# Patient Record
Sex: MALE | Race: WHITE | Employment: FULL TIME | URBAN - METROPOLITAN AREA
[De-identification: names, ages, dates, MRNs, and addresses within clinical notes are randomized per-mention and may not be internally consistent; named-entity substitution may affect disease eponyms.]

---

## 2017-05-02 ENCOUNTER — OFFICE VISIT (OUTPATIENT)
Dept: URGENT CARE | Facility: URGENT CARE | Age: 47
End: 2017-05-02
Payer: COMMERCIAL

## 2017-05-02 VITALS
BODY MASS INDEX: 50.28 KG/M2 | WEIGHT: 315 LBS | SYSTOLIC BLOOD PRESSURE: 128 MMHG | TEMPERATURE: 98.4 F | DIASTOLIC BLOOD PRESSURE: 86 MMHG | HEART RATE: 69 BPM

## 2017-05-02 DIAGNOSIS — L03.032 CELLULITIS OF TOE OF LEFT FOOT: Primary | ICD-10-CM

## 2017-05-02 DIAGNOSIS — L08.9 INFLAMMATION OF TOE: ICD-10-CM

## 2017-05-02 LAB
URATE SERPL-MCNC: 6.4 MG/DL (ref 3.5–7.2)
WBC # BLD AUTO: 4.5 10E9/L (ref 4–11)

## 2017-05-02 PROCEDURE — 85048 AUTOMATED LEUKOCYTE COUNT: CPT | Performed by: PHYSICIAN ASSISTANT

## 2017-05-02 PROCEDURE — 36415 COLL VENOUS BLD VENIPUNCTURE: CPT | Performed by: PHYSICIAN ASSISTANT

## 2017-05-02 PROCEDURE — 84550 ASSAY OF BLOOD/URIC ACID: CPT | Performed by: PHYSICIAN ASSISTANT

## 2017-05-02 PROCEDURE — 99213 OFFICE O/P EST LOW 20 MIN: CPT | Performed by: PHYSICIAN ASSISTANT

## 2017-05-02 RX ORDER — SULFAMETHOXAZOLE/TRIMETHOPRIM 800-160 MG
1 TABLET ORAL 2 TIMES DAILY
Qty: 14 TABLET | Refills: 0 | Status: SHIPPED | OUTPATIENT
Start: 2017-05-02

## 2017-05-02 NOTE — MR AVS SNAPSHOT
After Visit Summary   5/2/2017    Ramos Springer    MRN: [id number]           Patient Information     Date Of Birth          1970        Visit Information        Provider Department      5/2/2017 7:00 PM Leighann Marsh PA-C [facility]        Today's Diagnoses     Cellulitis of toe of left foot    -  1    Inflammation of toe           Follow-ups after your visit        Who to contact     If you have questions or need follow up information about today's clinic visit or your schedule please contact [facility] directly at [phone].  Normal or non-critical lab and imaging results will be communicated to you by Picklivehart, letter or phone within 4 business days after the clinic has received the results. If you do not hear from us within 7 days, please contact the clinic through Picklivehart or phone. If you have a critical or abnormal lab result, we will notify you by phone as soon as possible.  Submit refill requests through Triptease or call your pharmacy and they will forward the refill request to us. Please allow 3 business days for your refill to be completed.          Additional Information About Your Visit        MyChart Information     Triptease gives you secure access to your electronic health record. If you see a primary care provider, you can also send messages to your care team and make appointments. If you have questions, please call your primary care clinic.  If you do not have a primary care provider, please call [phone] and they will assist you.        Care EveryWhere ID     This is your Care EveryWhere ID. This could be used by other organizations to access your [place] medical records  [id number]        Your Vitals Were     Pulse Temperature BMI (Body Mass Index)             69 98.4  F (36.9  C) (Oral) 50.28 kg/m2          Blood Pressure from Last 3 Encounters:   05/02/17 128/86   04/16/16 110/80   05/17/15 (!) 148/104     Weight from Last 3 Encounters:   05/02/17 (!) 402 lb 4 oz (182.5 kg)   04/16/16 (!) 412 lb 9.6 oz (187.2 kg)   05/16/15 (!) 370 lb (167.8 kg)              We Performed the Following     Uric acid     WBC count          Today's Medication Changes          These changes are accurate as of: 5/2/17  8:16 PM.  If you have any questions, ask your nurse or doctor.               Start taking these medicines.        Dose/Directions    sulfamethoxazole-trimethoprim 800-160 MG per tablet   Commonly known as:  BACTRIM DS/SEPTRA DS   Used for:  Cellulitis of toe of left foot   Started by:  Leighann Marsh PA-C        Dose:  1 tablet   Take 1 tablet by mouth 2 times daily   Quantity:  14 tablet   Refills:  0            Where to get your medicines      These medications were sent to [facility] [address] [place] [address] [address] AT [address]  [address], [facility] [place] [facility] [address]    Hours:  24-hours Phone:  [phone]     sulfamethoxazole-trimethoprim 800-160 MG per tablet                Primary Care Provider Office Phone # Fax #    Oleg Shabazz [phone]       [facility] [address] [place] [address]        Thank you!     Thank you for choosing [facility]  for your care. Our goal is always to provide you with excellent care. Hearing back from our patients is one way we can continue to improve our services. Please take a few minutes to complete the written survey that you may receive in the mail after your visit with us. Thank you!             Your Updated Medication List - Protect others around you: Learn how to safely use, store and throw away your medicines at www.disposemymeds.org.          This list is accurate as of: 5/2/17  8:16 PM.  Always use your most recent med list.                   Brand Name Dispense Instructions for use    CYANOCOBALAMIN SL      Place 2,000 mcg under the tongue daily  Reported on 5/2/2017       ferrous sulfate 325 (65 FE) MG tablet    IRON     Take 325 mg by mouth daily (with breakfast) Reported on 5/2/2017       lisinopril 40 MG tablet    PRINIVIL/ZESTRIL     Take 40 mg by mouth daily       Multi-vitamin Tabs tablet      Take 2 tablets by mouth daily Reported on 5/2/2017       naproxen 500 MG tablet    NAPROSYN    60 tablet    Take 1 tablet (500 mg) by mouth 2 times daily (with meals)       sulfamethoxazole-trimethoprim 800-160 MG per tablet    BACTRIM DS/SEPTRA DS    14 tablet    Take 1 tablet by mouth 2 times daily       TUMS CALCIUM FOR LIFE BONE 750 MG Chew   Generic drug:  calcium carbonate      Take 750 mg by mouth 2 times daily Reported on 5/2/2017       vitamin C 500 MG Chew      Take 1 tablet by mouth daily Reported on 5/2/2017       VITAMIN D3 PO      Take 5,000 Units by mouth daily Reported on 5/2/2017       ZYRTEC 10 MG tablet   Generic drug:  cetirizine      Take 10 mg by mouth daily.

## 2017-05-02 NOTE — LETTER
[facility]    [address]  [address]  Phone: [phone]     May 2, 2017      RE: Ramos Springer  [address] [address]       To whom it may concern:    Ramos Springer was seen in our clinic today. Please excuse him from work 5/1/2017-5/2/2017.   Sincerely,      Leighann Marsh PA-C

## 2017-05-03 NOTE — PROGRESS NOTES
SUBJECTIVE:  Chief Complaint   Patient presents with     Toe Pain     left big toe swelling with pain for a few days.      Letter for School/Work     request work note.      Ramos Springer is a 46 year old male who presents with a chief complaint of left great toe swelling, tenderness and redness.  Symptoms began 2 day(s) ago, are moderate and still present  Context:  Injury:No.    Patient has a history of an infection on the sole of his foot that is currently being treated by [facility].   Pain exacerbated by pressure Relieved by rest.   This is not the first time this type of injury has occurred to this patient.     Patient does not have a history of DM.     Past Medical History:   Diagnosis Date     Hypersomnia with sleep apnea, unspecified     severe     Obesity, unspecified      Unspecified essential hypertension      Current Outpatient Prescriptions   Medication Sig Dispense Refill     lisinopril (PRINIVIL,ZESTRIL) 40 MG tablet Take 40 mg by mouth daily       cetirizine (ZYRTEC) 10 MG tablet Take 10 mg by mouth daily.       naproxen (NAPROSYN) 500 MG tablet Take 1 tablet (500 mg) by mouth 2 times daily (with meals) (Patient not taking: Reported on 5/2/2017) 60 tablet 0     ferrous sulfate 325 (65 FE) MG tablet Take 325 mg by mouth daily (with breakfast) Reported on 5/2/2017       Ascorbic Acid (VITAMIN C) 500 MG CHEW Take 1 tablet by mouth daily Reported on 5/2/2017       calcium carbonate (TUMS CALCIUM FOR LIFE BONE) 750 MG CHEW Take 750 mg by mouth 2 times daily Reported on 5/2/2017       multivitamin, therapeutic with minerals (MULTI-VITAMIN) TABS Take 2 tablets by mouth daily Reported on 5/2/2017       Cholecalciferol (VITAMIN D3 PO) Take 5,000 Units by mouth daily Reported on 5/2/2017       CYANOCOBALAMIN SL Place 2,000 mcg under the tongue daily Reported on 5/2/2017       Social History   Substance Use Topics     Smoking status: Passive Smoke Exposure - Never Smoker     Smokeless tobacco: Never Used       Comment: Pt works in a [employer]/passive     Alcohol use No       ROS:  Review of systems negative except as stated below    EXAM:   /86 (BP Location: Left arm, Patient Position: Chair, Cuff Size: Adult Large)  Pulse 69  Temp 98.4  F (36.9  C) (Oral)  Wt (!) 402 lb 4 oz (182.5 kg)  BMI 50.28 kg/m2  M/S Exam: Left toe has swelling, redness and tenderness to palpation poximal to nail. Underlying fungal infection of nail. erythema, swelling and tenderness to palpationGENERAL APPEARANCE: healthy, alert and no distress  EXTREMITIES: peripheral pulses normal  SKIN: no suspicious lesions or rashes  NEURO: Normal strength and tone, sensory exam grossly normal, mentation intact and speech normal  Results for orders placed or performed in visit on 05/02/17   WBC count   Result Value Ref Range    WBC 4.5 4.0 - 11.0 10e9/L   Uric acid   Result Value Ref Range    Uric Acid 6.4 3.5 - 7.2 mg/dL       X-RAY was not done.    ASSESSMENT/PLAN:  1. Cellulitis of toe of left foot  Underlying fungal infection. Follow up with [facility] this week.   - sulfamethoxazole-trimethoprim (BACTRIM DS/SEPTRA DS) 800-160 MG per tablet; Take 1 tablet by mouth 2 times daily  Dispense: 14 tablet; Refill: 0    2. Inflammation of toe  - WBC count  - Uric acid    Leighann Marsh PA-C

## 2017-05-03 NOTE — NURSING NOTE
"Chief Complaint   Patient presents with     Toe Pain     left big toe swelling with pain for a few days.      Letter for School/Work     request work note.        Initial /86 (BP Location: Left arm, Patient Position: Chair, Cuff Size: Adult Large)  Pulse 69  Temp 98.4  F (36.9  C) (Oral)  Wt (!) 402 lb 4 oz (182.5 kg)  BMI 50.28 kg/m2 Estimated body mass index is 50.28 kg/(m^2) as calculated from the following:    Height as of 4/16/16: 6' 3\" (1.905 m).    Weight as of this encounter: 402 lb 4 oz (182.5 kg).  Medication Reconciliation: complete    "

## 2017-06-11 ENCOUNTER — HOSPITAL ENCOUNTER (EMERGENCY)
Facility: CLINIC | Age: 47
Discharge: HOME | End: 2017-06-11
Attending: EMERGENCY MEDICINE | Admitting: EMERGENCY MEDICINE
Payer: COMMERCIAL

## 2017-06-11 ENCOUNTER — APPOINTMENT (OUTPATIENT)
Dept: MRI IMAGING | Facility: CLINIC | Age: 47
End: 2017-06-11
Attending: EMERGENCY MEDICINE
Payer: COMMERCIAL

## 2017-06-11 VITALS
TEMPERATURE: 98.3 F | HEART RATE: 74 BPM | WEIGHT: 315 LBS | OXYGEN SATURATION: 100 % | RESPIRATION RATE: 16 BRPM | DIASTOLIC BLOOD PRESSURE: 91 MMHG | HEIGHT: 75 IN | BODY MASS INDEX: 39.17 KG/M2 | SYSTOLIC BLOOD PRESSURE: 138 MMHG

## 2017-06-11 DIAGNOSIS — L08.9 INFECTION OF TOE: ICD-10-CM

## 2017-06-11 LAB
ANION GAP SERPL CALCULATED.3IONS-SCNC: 5 MMOL/L (ref 3–14)
BASOPHILS # BLD AUTO: 0 10E9/L (ref 0–0.2)
BASOPHILS NFR BLD AUTO: 0.6 %
BUN SERPL-MCNC: 11 MG/DL (ref 7–30)
CALCIUM SERPL-MCNC: 8.7 MG/DL (ref 8.5–10.1)
CHLORIDE SERPL-SCNC: 107 MMOL/L (ref 94–109)
CO2 SERPL-SCNC: 29 MMOL/L (ref 20–32)
CREAT SERPL-MCNC: 0.96 MG/DL (ref 0.66–1.25)
CRP SERPL-MCNC: 3.1 MG/L (ref 0–8)
DIFFERENTIAL METHOD BLD: NORMAL
EOSINOPHIL # BLD AUTO: 0.1 10E9/L (ref 0–0.7)
EOSINOPHIL NFR BLD AUTO: 1.6 %
ERYTHROCYTE [DISTWIDTH] IN BLOOD BY AUTOMATED COUNT: 14.2 % (ref 10–15)
ERYTHROCYTE [SEDIMENTATION RATE] IN BLOOD BY WESTERGREN METHOD: 8 MM/H (ref 0–15)
GFR SERPL CREATININE-BSD FRML MDRD: 85 ML/MIN/1.7M2
GLUCOSE SERPL-MCNC: 95 MG/DL (ref 70–99)
HCT VFR BLD AUTO: 43.1 % (ref 40–53)
HGB BLD-MCNC: 14.2 G/DL (ref 13.3–17.7)
IMM GRANULOCYTES # BLD: 0 10E9/L (ref 0–0.4)
IMM GRANULOCYTES NFR BLD: 0 %
LACTATE BLD-SCNC: 1 MMOL/L (ref 0.7–2.1)
LYMPHOCYTES # BLD AUTO: 1.5 10E9/L (ref 0.8–5.3)
LYMPHOCYTES NFR BLD AUTO: 29.4 %
MCH RBC QN AUTO: 30.3 PG (ref 26.5–33)
MCHC RBC AUTO-ENTMCNC: 32.9 G/DL (ref 31.5–36.5)
MCV RBC AUTO: 92 FL (ref 78–100)
MONOCYTES # BLD AUTO: 0.7 10E9/L (ref 0–1.3)
MONOCYTES NFR BLD AUTO: 13.1 %
NEUTROPHILS # BLD AUTO: 2.7 10E9/L (ref 1.6–8.3)
NEUTROPHILS NFR BLD AUTO: 55.3 %
NRBC # BLD AUTO: 0 10*3/UL
NRBC BLD AUTO-RTO: 0 /100
PLATELET # BLD AUTO: 233 10E9/L (ref 150–450)
POTASSIUM SERPL-SCNC: 3.9 MMOL/L (ref 3.4–5.3)
RBC # BLD AUTO: 4.69 10E12/L (ref 4.4–5.9)
SODIUM SERPL-SCNC: 141 MMOL/L (ref 133–144)
WBC # BLD AUTO: 5 10E9/L (ref 4–11)

## 2017-06-11 PROCEDURE — 85652 RBC SED RATE AUTOMATED: CPT | Performed by: EMERGENCY MEDICINE

## 2017-06-11 PROCEDURE — 96365 THER/PROPH/DIAG IV INF INIT: CPT | Mod: 59

## 2017-06-11 PROCEDURE — A9585 GADOBUTROL INJECTION: HCPCS | Performed by: EMERGENCY MEDICINE

## 2017-06-11 PROCEDURE — 86140 C-REACTIVE PROTEIN: CPT | Performed by: EMERGENCY MEDICINE

## 2017-06-11 PROCEDURE — 25000128 H RX IP 250 OP 636: Performed by: EMERGENCY MEDICINE

## 2017-06-11 PROCEDURE — 85025 COMPLETE CBC W/AUTO DIFF WBC: CPT | Performed by: EMERGENCY MEDICINE

## 2017-06-11 PROCEDURE — 80048 BASIC METABOLIC PNL TOTAL CA: CPT | Performed by: EMERGENCY MEDICINE

## 2017-06-11 PROCEDURE — 83605 ASSAY OF LACTIC ACID: CPT | Performed by: EMERGENCY MEDICINE

## 2017-06-11 PROCEDURE — 96366 THER/PROPH/DIAG IV INF ADDON: CPT

## 2017-06-11 PROCEDURE — 73723 MRI JOINT LWR EXTR W/O&W/DYE: CPT | Mod: LT

## 2017-06-11 PROCEDURE — 99285 EMERGENCY DEPT VISIT HI MDM: CPT | Mod: 25

## 2017-06-11 RX ORDER — GADOBUTROL 604.72 MG/ML
20 INJECTION INTRAVENOUS ONCE
Status: COMPLETED | OUTPATIENT
Start: 2017-06-11 | End: 2017-06-11

## 2017-06-11 RX ORDER — LEVOFLOXACIN 500 MG/1
500 TABLET, FILM COATED ORAL DAILY
Qty: 7 TABLET | Refills: 0 | Status: SHIPPED | OUTPATIENT
Start: 2017-06-11 | End: 2017-06-18

## 2017-06-11 RX ORDER — LEVOFLOXACIN 5 MG/ML
750 INJECTION, SOLUTION INTRAVENOUS ONCE
Status: COMPLETED | OUTPATIENT
Start: 2017-06-11 | End: 2017-06-11

## 2017-06-11 RX ADMIN — LEVOFLOXACIN 750 MG: 5 INJECTION, SOLUTION INTRAVENOUS at 15:30

## 2017-06-11 RX ADMIN — GADOBUTROL 20 ML: 604.72 INJECTION INTRAVENOUS at 14:38

## 2017-06-11 ASSESSMENT — ENCOUNTER SYMPTOMS
NUMBNESS: 0
WOUND: 1
COLOR CHANGE: 1
FEVER: 0
CHILLS: 0

## 2017-06-11 NOTE — ED AVS SNAPSHOT
Emergency Department    [address] [facility] [address]    Phone:  [phone]    Fax:  [phone]                                       Ramos Springer   MRN: [id number]    Department:   Emergency Department   Date of Visit:  6/11/2017           Patient Information     Date Of Birth          1970        Your diagnoses for this visit were:     Infection of toe        You were seen by Ajay Johnson MD.      Follow-up Information     Schedule an appointment as soon as possible for a visit with Oleg Shabazz MD.    Specialties:  Internal Medicine, Pediatrics    Contact information:    [facility]  [address] [place] [address] [phone]          Schedule an appointment as soon as possible for a visit with Mannie Barber MD.    Specialty:  Orthopedics    Contact information:    [facility]   [address] [facility] [address]  [facility] [address] [phone]          Follow up with  Emergency Department.    Specialty:  EMERGENCY MEDICINE    Why:  As needed, If symptoms worsen    Contact information:    [address] [facility] [id number] [phone]        Discharge Instructions         Discharge Instructions  Cellulitis    Cellulitis is an infection of the skin that occurs when bacteria enter the skin.   Symptoms are generally redness, swelling, warmth and pain.  Your infection appeared to be appropriate to treat at home with antibiotics.  However, sometimes your infection may be worse than it seemed at first, or may worsen with time. If you have new or worse symptoms, you may need to be seen again in the Emergency Department or by your primary doctor.    Return to the Emergency Department if:    The redness, pain, or swelling gets a lot worse.  If the red area was marked, return if it is red beyond the marked area.    You are unable to get your antibiotics, or are vomiting them up or you can t take  "them.    You are feeling more ill, weak or lightheaded.    You start to run a new fever (temperature >101).    Anything else about the infection worries or concerns you.  Treatment:    Start your antibiotics right away, and take them as prescribed. Be sure to finish the whole prescription, even if you are better.    Apply a heating pad, warm packs, or warm water soaks to the infected area for 15 minutes at a time, at least 3 times a day. Do not use a heating pad on your feet or legs if you have diabetes. Do not sleep with a heating pad on, since this can cause burns or skin injury.    Rest your injured area for at least 1-2 days. After that you may start using your extremity again as long as there is not too much pain.     Raise the injured area above the level of your heart as much as possible in the first 1-2 days.    Tylenol  (acetaminophen), Motrin  (ibuprofen), or Advil  (ibuprofen) may help may help reduce pain and fever and may help you feel more comfortable. Be sure to read and follow the package directions, and ask your doctor if you have questions.    Follow-up with your doctor:    Re-check in clinic within 2-3 days.  Probiotics: If you have been given an antibiotic, you may want to also take a probiotic pill or eat yogurt with live cultures. Probiotics have \"good bacteria\" to help your intestines stay healthy. Studies have shown that probiotics help prevent diarrhea and other intestine problems (including C. diff infection) when you take antibiotics. You can buy these without a prescription in the pharmacy section of the store.     If you were given a prescription for medicine here today, be sure to read all of the information (including the package insert) that comes with your prescription.  This will include important information about the medicine, its side effects, and any warnings that you need to know about.  The pharmacist who fills the prescription can provide more information and answer questions " you may have about the medicine.  If you have questions or concerns that the pharmacist cannot address, please call or return to the Emergency Department.     Opioid Medication Information    Pain medications are among the most commonly prescribed medicines, so we are including this information for all our patients. If you did not receive pain medication or get a prescription for pain medicine, you can ignore it.     You may have been given a prescription for an opioid (narcotic) pain medicine and/or have received a pain medicine while here in the Emergency Department. These medicines can make you drowsy or impaired. You must not drive, operate dangerous equipment, or engage in any other dangerous activities while taking these medications. If you drive while taking these medications, you could be arrested for DUI, or driving under the influence. Do not drink any alcohol while you are taking these medications.     Opioid pain medications can cause addiction. If you have a history of chemical dependency of any type, you are at a higher risk of becoming addicted to pain medications.  Only take these prescribed medications to treat your pain when all other options have been tried. Take it for as short a time and as few doses as possible. Store your pain pills in a secure place, as they are frequently stolen and provide a dangerous opportunity for children or visitors in your house to start abusing these powerful medications. We will not replace any lost or stolen medicine.  As soon as your pain is better, you should flush all your remaining medication.     Many prescription pain medications contain Tylenol  (acetaminophen), including Vicodin , Tylenol #3 , Norco , Lortab , and Percocet .  You should not take any extra pills of Tylenol  if you are using these prescription medications or you can get very sick.  Do not ever take more than 3000 mg of acetaminophen in any 24 hour period.    All opioids tend to cause  constipation. Drink plenty of water and eat foods that have a lot of fiber, such as fruits, vegetables, prune juice, apple juice and high fiber cereal.  Take a laxative if you don t move your bowels at least every other day. Miralax , Milk of Magnesia, Colace , or Senna  can be used to keep you regular.      Remember that you can always come back to the Emergency Department if you are not able to see your regular doctor in the amount of time listed above, if you get any new symptoms, or if there is anything that worries you.      24 Hour Appointment Hotline       To make an appointment at any [facility], call [phone] ([phone]). If you don't have a family doctor or clinic, we will help you find one. [facility] clinics are conveniently located to serve the needs of you and your family.             Review of your medicines      START taking        Dose / Directions Last dose taken    levofloxacin 500 MG tablet   Commonly known as:  LEVAQUIN   Dose:  500 mg   Quantity:  7 tablet        Take 1 tablet (500 mg) by mouth daily for 7 days   Refills:  0          Our records show that you are taking the medicines listed below. If these are incorrect, please call your family doctor or clinic.        Dose / Directions Last dose taken    CYANOCOBALAMIN SL   Dose:  2000 mcg        Place 2,000 mcg under the tongue daily Reported on 5/2/2017   Refills:  0        ferrous sulfate 325 (65 FE) MG tablet   Commonly known as:  IRON   Dose:  325 mg        Take 325 mg by mouth daily (with breakfast) Reported on 5/2/2017   Refills:  0        lisinopril 40 MG tablet   Commonly known as:  PRINIVIL/ZESTRIL   Dose:  40 mg        Take 40 mg by mouth daily   Refills:  0        Multi-vitamin Tabs tablet   Dose:  2 tablet        Take 2 tablets by mouth daily Reported on 5/2/2017   Refills:  0        naproxen 500 MG tablet   Commonly known as:  NAPROSYN   Dose:  500 mg   Quantity:  60 tablet        Take 1 tablet (500 mg) by mouth 2  times daily (with meals)   Refills:  0        sulfamethoxazole-trimethoprim 800-160 MG per tablet   Commonly known as:  BACTRIM DS/SEPTRA DS   Dose:  1 tablet   Quantity:  14 tablet        Take 1 tablet by mouth 2 times daily   Refills:  0        TUMS CALCIUM FOR LIFE BONE 750 MG Chew   Dose:  750 mg   Generic drug:  calcium carbonate        Take 750 mg by mouth 2 times daily Reported on 5/2/2017   Refills:  0        vitamin C 500 MG Chew   Dose:  1 tablet        Take 1 tablet by mouth daily Reported on 5/2/2017   Refills:  0        VITAMIN D3 PO   Dose:  5000 Units        Take 5,000 Units by mouth daily Reported on 5/2/2017   Refills:  0        ZYRTEC 10 MG tablet   Dose:  10 mg   Generic drug:  cetirizine        Take 10 mg by mouth daily.   Refills:  0                Prescriptions were sent or printed at these locations (1 Prescription)                   Other Prescriptions                Printed at Department/Unit printer (1 of 1)         levofloxacin (LEVAQUIN) 500 MG tablet                Procedures and tests performed during your visit     Basic metabolic panel    CBC with platelets differential    CRP inflammation    Erythrocyte sedimentation rate auto    Lactic acid whole blood    MR Toe Left w/o & w Contrast    Peripheral IV catheter      Orders Needing Specimen Collection     None      Pending Results     Date and Time Order Name Status Description    6/11/2017 1336 MR Toe Left w/o & w Contrast In process             Pending Culture Results     No orders found from 6/9/2017 to 6/12/2017.            Pending Results Instructions     If you had any lab results that were not finalized at the time of your Discharge, you can call the ED Lab Result RN at [phone]. You will be contacted by this team for any positive Lab results or changes in treatment. The nurses are available 7 days a week from 10A to 6:30P.  You can leave a message 24 hours per day and they will return your call.        Test Results From  Your Hospital Stay        6/11/2017  2:07 PM      Component Results     Component Value Ref Range & Units Status    WBC 5.0 4.0 - 11.0 10e9/L Final    RBC Count 4.69 4.4 - 5.9 10e12/L Final    Hemoglobin 14.2 13.3 - 17.7 g/dL Final    Hematocrit 43.1 40.0 - 53.0 % Final    MCV 92 78 - 100 fl Final    MCH 30.3 26.5 - 33.0 pg Final    MCHC 32.9 31.5 - 36.5 g/dL Final    RDW 14.2 10.0 - 15.0 % Final    Platelet Count 233 150 - 450 10e9/L Final    Diff Method Automated Method  Final    % Neutrophils 55.3 % Final    % Lymphocytes 29.4 % Final    % Monocytes 13.1 % Final    % Eosinophils 1.6 % Final    % Basophils 0.6 % Final    % Immature Granulocytes 0.0 % Final    Nucleated RBCs 0 0 /100 Final    Absolute Neutrophil 2.7 1.6 - 8.3 10e9/L Final    Absolute Lymphocytes 1.5 0.8 - 5.3 10e9/L Final    Absolute Monocytes 0.7 0.0 - 1.3 10e9/L Final    Absolute Eosinophils 0.1 0.0 - 0.7 10e9/L Final    Absolute Basophils 0.0 0.0 - 0.2 10e9/L Final    Abs Immature Granulocytes 0.0 0 - 0.4 10e9/L Final    Absolute Nucleated RBC 0.0  Final         6/11/2017  2:16 PM      Component Results     Component Value Ref Range & Units Status    Sed Rate 8 0 - 15 mm/h Final         6/11/2017  2:21 PM      Component Results     Component Value Ref Range & Units Status    CRP Inflammation 3.1 0.0 - 8.0 mg/L Final         6/11/2017  2:21 PM      Component Results     Component Value Ref Range & Units Status    Sodium 141 133 - 144 mmol/L Final    Potassium 3.9 3.4 - 5.3 mmol/L Final    Chloride 107 94 - 109 mmol/L Final    Carbon Dioxide 29 20 - 32 mmol/L Final    Anion Gap 5 3 - 14 mmol/L Final    Glucose 95 70 - 99 mg/dL Final    Urea Nitrogen 11 7 - 30 mg/dL Final    Creatinine 0.96 0.66 - 1.25 mg/dL Final    GFR Estimate 85 >60 mL/min/1.7m2 Final    Non  GFR Calc    GFR Estimate If Black >90   GFR Calc   >60 mL/min/1.7m2 Final    Calcium 8.7 8.5 - 10.1 mg/dL Final         6/11/2017  2:06 PM      Component  Results     Component Value Ref Range & Units Status    Lactic Acid 1.0 0.7 - 2.1 mmol/L Final         6/11/2017  2:08 PM      Result not yet available     Exam Ended                Clinical Quality Measure: Blood Pressure Screening     Your blood pressure was checked while you were in the emergency department today. The last reading we obtained was  BP: (!) 152/98 . Please read the guidelines below about what these numbers mean and what you should do about them.  If your systolic blood pressure (the top number) is less than 120 and your diastolic blood pressure (the bottom number) is less than 80, then your blood pressure is normal. There is nothing more that you need to do about it.  If your systolic blood pressure (the top number) is 120-139 or your diastolic blood pressure (the bottom number) is 80-89, your blood pressure may be higher than it should be. You should have your blood pressure rechecked within a year by a primary care provider.  If your systolic blood pressure (the top number) is 140 or greater or your diastolic blood pressure (the bottom number) is 90 or greater, you may have high blood pressure. High blood pressure is treatable, but if left untreated over time it can put you at risk for heart attack, stroke, or kidney failure. You should have your blood pressure rechecked by a primary care provider within the next 4 weeks.  If your provider in the emergency department today gave you specific instructions to follow-up with your doctor or provider even sooner than that, you should follow that instruction and not wait for up to 4 weeks for your follow-up visit.        Thank you for choosing [facility]       Thank you for choosing [facility] for your care. Our goal is always to provide you with excellent care. Hearing back from our patients is one way we can continue to improve our services. Please take a few minutes to complete the written survey that you may receive in the mail after you visit with us.  Thank you!        [facility] Information     [facility] gives you secure access to your electronic health record. If you see a primary care provider, you can also send messages to your care team and make appointments. If you have questions, please call your primary care clinic.  If you do not have a primary care provider, please call [phone] and they will assist you.        Care EveryWhere ID     This is your Care EveryWhere ID. This could be used by other organizations to access your [place] medical records  [id number]        After Visit Summary       This is your record. Keep this with you and show to your community pharmacist(s) and doctor(s) at your next visit.

## 2017-06-11 NOTE — ED PROVIDER NOTES
"  History     Chief Complaint:  Wound check     HPI   Ramos Springer is an obese 46 year old male who presents for a wound check. The patient apparently attempted to cut off a callus from his left great toe two years ago but misjudged the depth. This has never fully healed since the initial incident two years ago. He has been seen for this by several specialists including Dr. Barber of [facility] as well as Dr. Gomez of [facility] and had an MRI of his toe 2-3 months ago. There were apparently plans for admission for IV antibiotics and/or surgery two months ago but patient states this \"fell through,\" citing psychosocial stressors. He presents today with concern for increased swelling and redness to the left great toe and dorsal foot. He has been ambulatory with a moon boot. The patient denies any fever or chills, proximal spreading, any other symptoms, or any other acute symptoms.       Allergies:  Ceftin (rash)  Erythromycin (rash)  Penicillins (rash)      Medications:    naproxen (NAPROSYN) 500 MG tablet  ferrous sulfate 325 (65 FE) MG tablet  Ascorbic Acid (VITAMIN C) 500 MG CHEW  calcium carbonate (TUMS CALCIUM FOR LIFE BONE) 750 MG CHEW  multivitamin, therapeutic with minerals (MULTI-VITAMIN) TABS  Cholecalciferol (VITAMIN D3 PO)  CYANOCOBALAMIN SL  lisinopril (PRINIVIL,ZESTRIL) 40 MG tablet  cetirizine (ZYRTEC) 10 MG tablet     Past Medical History:    Obesity  Hypertension  Peripheral enthesopathy   History of pancreatitis     Past Surgical History:    Gastric bypass Gretchen en Y  Hernia repair, bilateral inguinal  Tympanoplasty  Tonsillectomy and adenoidectomy  Ankle ORIF    Family History:    CAD (paternal grandfather)  Aortic aneurysm (paternal grandfather)  Ca, breast (mother, paternal grandmother, maternal grandmother)    Social History:  Passive tobacco exposure. Non-drinker.  Marital Status:   [2]      Review of Systems   Constitutional: Negative for chills and fever.   Skin: " "Positive for color change (see HPI) and wound (see HPI).   Neurological: Negative for numbness.   All other systems reviewed and are negative.        Physical Exam   First Vitals:  Patient Vitals for the past 24 hrs:   BP Temp Temp src Pulse Resp SpO2 Height Weight   06/11/17 1524 - - - 75 - 100 % - -   06/11/17 1523 (!) 152/98 - - - - - - -   06/11/17 1321 (!) 152/95 98.3  F (36.8  C) Oral 112 16 96 % 1.905 m (6' 3\") (!) 182.8 kg (403 lb)      Physical Exam  General: Alert, interactive, in mild distress. Obese.   Head:  Scalp is atraumatic  Eyes:  The pupils are equal, round, and reactive to light    EOM's intact    No scleral icterus  ENT:      Nose:  The external nose is normal  Ears:  External ears are normal  Mouth/Throat: The oropharynx is normal    Mucus membranes are moist       Neck:  Normal range of motion.      There is no rigidity.    Trachea is in the midline         CV:  Regular tachycardia    No murmur   Resp:  Breath sounds are clear bilaterally    Non-labored, no retractions or accessory muscle use       MS:  Normal strength in all 4 extremities.    Left great toe with large callus on medial aspect with laceration extending deeply. Mild surrounding erythema. Dorsum of foot erythematous.   Skin:  Warm and dry, No rash or lesions noted.  Neuro: Strength 5/5 x4.  Sensation intact  In all 4 extremities.        Psych:  Awake. Alert.  Normal affect.      Appropriate interactions.    Emergency Department Course   Imaging:  Radiographic findings were communicated with the patient who voiced understanding of the findings.  MR Toes, Left, w/ contrast: Inflammatory tissue adjacent to the distal phalanx of the first toe. No significant edema or bony destructive changes in the first toe to suggest osteomyelitis.  Results per Radiology.     Laboratory:  CRP: 3.1 (WNL)   ESR: 8 (WNL)   BMP: Cr 0.96 (WNL), o/w WNL   Lactic acid: 1.0 (WNL)   CBC w/diff: WNL (WBC 5.0, HGB 14.2, )      Interventions:  1530: " levofloxacin 750mg, IV    Emergency Department Course:  Past medical records, nursing notes, and vitals reviewed.  1330: I performed an exam of the patient as documented above.    The above imaging study and labs were ordered.   IV access was established. The patient was given the above interventions.    1518: Rechecked patient. Updated on findings and plan.  1540: I rechecked the patient.  Findings and plan explained to the Patient. Patient discharged home with instructions regarding supportive care, medications, and reasons to return. The importance of close follow-up was reviewed.      Impression & Plan      Medical Decision Making:  The patient was seen and evaluated and the above workup was undertaken. Given the chronicity of patient's foot wound, MRI was undertaken to evaluate for osteomyelitis. Thankfully, this returned demonstrating no signs of bony involvement. There are no signs of septic arthritis or gout. I have placed him on Levofloxacin as he has a significant Penicillin allergies and cephalosporin allergies. I have recommended he continue to wear his boot, follow up with his foot surgeon, and return if new symptoms develop. There are no signs of sepsis syndrome, necrotizing fasciitis, DVT, or more concerning illness.      Diagnosis:    ICD-10-CM    1. Infection of left great toe L08.9        Disposition:  Discharged to home     Discharge Medications:  New Prescriptions    LEVOFLOXACIN (LEVAQUIN) 500 MG TABLET    Take 1 tablet (500 mg) by mouth daily for 7 days       Kunal Ramirez  6/11/2017    EMERGENCY DEPARTMENT  IKunal, am serving as a scribe at 3:42 PM on 6/11/2017 to document services personally performed by Ajay Johnson MD based on my observations and the provider's statements to me.        Ajay Johnson MD  06/11/17 8550

## 2017-06-11 NOTE — DISCHARGE INSTRUCTIONS
Discharge Instructions  Cellulitis    Cellulitis is an infection of the skin that occurs when bacteria enter the skin.   Symptoms are generally redness, swelling, warmth and pain.  Your infection appeared to be appropriate to treat at home with antibiotics.  However, sometimes your infection may be worse than it seemed at first, or may worsen with time. If you have new or worse symptoms, you may need to be seen again in the Emergency Department or by your primary doctor.    Return to the Emergency Department if:    The redness, pain, or swelling gets a lot worse.  If the red area was marked, return if it is red beyond the marked area.    You are unable to get your antibiotics, or are vomiting them up or you can t take them.    You are feeling more ill, weak or lightheaded.    You start to run a new fever (temperature >101).    Anything else about the infection worries or concerns you.  Treatment:    Start your antibiotics right away, and take them as prescribed. Be sure to finish the whole prescription, even if you are better.    Apply a heating pad, warm packs, or warm water soaks to the infected area for 15 minutes at a time, at least 3 times a day. Do not use a heating pad on your feet or legs if you have diabetes. Do not sleep with a heating pad on, since this can cause burns or skin injury.    Rest your injured area for at least 1-2 days. After that you may start using your extremity again as long as there is not too much pain.     Raise the injured area above the level of your heart as much as possible in the first 1-2 days.    Tylenol  (acetaminophen), Motrin  (ibuprofen), or Advil  (ibuprofen) may help may help reduce pain and fever and may help you feel more comfortable. Be sure to read and follow the package directions, and ask your doctor if you have questions.    Follow-up with your doctor:    Re-check in clinic within 2-3 days.  Probiotics: If you have been given an antibiotic, you may want to also  "take a probiotic pill or eat yogurt with live cultures. Probiotics have \"good bacteria\" to help your intestines stay healthy. Studies have shown that probiotics help prevent diarrhea and other intestine problems (including C. diff infection) when you take antibiotics. You can buy these without a prescription in the pharmacy section of the store.     If you were given a prescription for medicine here today, be sure to read all of the information (including the package insert) that comes with your prescription.  This will include important information about the medicine, its side effects, and any warnings that you need to know about.  The pharmacist who fills the prescription can provide more information and answer questions you may have about the medicine.  If you have questions or concerns that the pharmacist cannot address, please call or return to the Emergency Department.     Opioid Medication Information    Pain medications are among the most commonly prescribed medicines, so we are including this information for all our patients. If you did not receive pain medication or get a prescription for pain medicine, you can ignore it.     You may have been given a prescription for an opioid (narcotic) pain medicine and/or have received a pain medicine while here in the Emergency Department. These medicines can make you drowsy or impaired. You must not drive, operate dangerous equipment, or engage in any other dangerous activities while taking these medications. If you drive while taking these medications, you could be arrested for DUI, or driving under the influence. Do not drink any alcohol while you are taking these medications.     Opioid pain medications can cause addiction. If you have a history of chemical dependency of any type, you are at a higher risk of becoming addicted to pain medications.  Only take these prescribed medications to treat your pain when all other options have been tried. Take it for as short " a time and as few doses as possible. Store your pain pills in a secure place, as they are frequently stolen and provide a dangerous opportunity for children or visitors in your house to start abusing these powerful medications. We will not replace any lost or stolen medicine.  As soon as your pain is better, you should flush all your remaining medication.     Many prescription pain medications contain Tylenol  (acetaminophen), including Vicodin , Tylenol #3 , Norco , Lortab , and Percocet .  You should not take any extra pills of Tylenol  if you are using these prescription medications or you can get very sick.  Do not ever take more than 3000 mg of acetaminophen in any 24 hour period.    All opioids tend to cause constipation. Drink plenty of water and eat foods that have a lot of fiber, such as fruits, vegetables, prune juice, apple juice and high fiber cereal.  Take a laxative if you don t move your bowels at least every other day. Miralax , Milk of Magnesia, Colace , or Senna  can be used to keep you regular.      Remember that you can always come back to the Emergency Department if you are not able to see your regular doctor in the amount of time listed above, if you get any new symptoms, or if there is anything that worries you.

## 2017-06-11 NOTE — ED AVS SNAPSHOT
Emergency Department    [address] [place] [id number]    Phone:  [phone]    Fax:  [phone]                                       Ramos Springer   MRN: [id number]    Department:   Emergency Department   Date of Visit:  6/11/2017           After Visit Summary Signature Page     I have received my discharge instructions, and my questions have been answered. I have discussed any challenges I see with this plan with the nurse or doctor.    ..........................................................................................................................................  Patient/Patient Representative Signature      ..........................................................................................................................................  Patient Representative Print Name and Relationship to Patient    ..................................................               ................................................  Date                                            Time    ..........................................................................................................................................  Reviewed by Signature/Title    ...................................................              ..............................................  Date                                                            Time

## 2019-05-01 ENCOUNTER — TELEPHONE (OUTPATIENT)
Dept: SURGERY | Facility: CLINIC | Age: 49
End: 2019-05-01

## 2019-05-01 NOTE — TELEPHONE ENCOUNTER
"Patient had gastric bypass 3/16/2006.  Calls today with concerns re: \"things have been better\".    States he moved to [place], [place] after splitting with his wife.  States he is trying to get his life back on track.   States his wife would not permit him to come to this clinic our would threaten that he couldn't see their daughter.    Patient had PCP that retired and is seeing a new MD.  Wants to get started on Phentermine and wanted to know what dose he was on prior to surgery in 06.  States he gained quite a bit of weight due to thyroid quit, which he is taking care of.   Wants to start losing weight.    Informed patient that due to the years that have passed, this writer could no longer see the notes from his visits that were here.  Recommended patient be seen by PCP for Phentermine.  Also recommended patient get connected with bariatric clinic in [place].  Gave names and numbers for clinics in [place] and [place], [place].  Wished patient well.  Yumiko Recinos, MS, RD, RN    "